# Patient Record
Sex: MALE | URBAN - METROPOLITAN AREA
[De-identification: names, ages, dates, MRNs, and addresses within clinical notes are randomized per-mention and may not be internally consistent; named-entity substitution may affect disease eponyms.]

---

## 2018-05-14 ENCOUNTER — HOSPITAL ENCOUNTER (EMERGENCY)
Facility: HOSPITAL | Age: 23
Discharge: LEFT BEFORE DISCHARGE | End: 2018-05-14

## 2018-05-14 VITALS
HEIGHT: 74 IN | RESPIRATION RATE: 16 BRPM | WEIGHT: 158 LBS | BODY MASS INDEX: 20.28 KG/M2 | OXYGEN SATURATION: 96 % | TEMPERATURE: 97.3 F | HEART RATE: 95 BPM

## 2018-05-15 NOTE — ED TRIAGE NOTES
Pt requests to leave the er. Pt asked to sign LWBS which was completed. Pt advised to make sure that his wound does not get infected; to watch for reddness, warmth at the site and streaking. Pt advised to use antibiotic ointment on the wound and to keep it clean for the next few days. Pt told to come back to the ER if he feels the need to get his wound further checked.